# Patient Record
Sex: FEMALE | Race: OTHER | NOT HISPANIC OR LATINO
[De-identification: names, ages, dates, MRNs, and addresses within clinical notes are randomized per-mention and may not be internally consistent; named-entity substitution may affect disease eponyms.]

---

## 2017-02-01 ENCOUNTER — APPOINTMENT (OUTPATIENT)
Dept: OTOLARYNGOLOGY | Facility: CLINIC | Age: 72
End: 2017-02-01

## 2017-02-01 VITALS
OXYGEN SATURATION: 98 % | TEMPERATURE: 98.7 F | HEART RATE: 59 BPM | DIASTOLIC BLOOD PRESSURE: 68 MMHG | SYSTOLIC BLOOD PRESSURE: 125 MMHG

## 2017-02-01 RX ORDER — AMOXICILLIN AND CLAVULANATE POTASSIUM 875; 125 MG/1; MG/1
875-125 TABLET, COATED ORAL
Qty: 20 | Refills: 0 | Status: ACTIVE | COMMUNITY
Start: 2017-02-01 | End: 1900-01-01

## 2017-02-13 ENCOUNTER — APPOINTMENT (OUTPATIENT)
Dept: OTOLARYNGOLOGY | Facility: CLINIC | Age: 72
End: 2017-02-13

## 2017-02-13 VITALS — DIASTOLIC BLOOD PRESSURE: 69 MMHG | SYSTOLIC BLOOD PRESSURE: 107 MMHG | OXYGEN SATURATION: 96 % | HEART RATE: 66 BPM

## 2017-03-13 ENCOUNTER — APPOINTMENT (OUTPATIENT)
Dept: OTOLARYNGOLOGY | Facility: CLINIC | Age: 72
End: 2017-03-13

## 2017-03-13 VITALS
HEART RATE: 68 BPM | TEMPERATURE: 98.5 F | DIASTOLIC BLOOD PRESSURE: 66 MMHG | OXYGEN SATURATION: 95 % | SYSTOLIC BLOOD PRESSURE: 119 MMHG

## 2017-03-13 RX ORDER — CLOBETASOL PROPIONATE 0.5 MG/G
0.05 CREAM TOPICAL 3 TIMES DAILY
Qty: 1 | Refills: 0 | Status: ACTIVE | COMMUNITY
Start: 2017-03-13 | End: 1900-01-01

## 2017-05-10 ENCOUNTER — RX RENEWAL (OUTPATIENT)
Age: 72
End: 2017-05-10

## 2017-05-15 ENCOUNTER — APPOINTMENT (OUTPATIENT)
Dept: OTOLARYNGOLOGY | Facility: CLINIC | Age: 72
End: 2017-05-15

## 2017-05-15 VITALS
HEART RATE: 61 BPM | DIASTOLIC BLOOD PRESSURE: 73 MMHG | OXYGEN SATURATION: 98 % | TEMPERATURE: 98.3 F | SYSTOLIC BLOOD PRESSURE: 129 MMHG

## 2017-05-15 RX ORDER — LOSARTAN POTASSIUM 100 MG/1
100 TABLET, FILM COATED ORAL
Qty: 90 | Refills: 0 | Status: ACTIVE | COMMUNITY
Start: 2016-05-23

## 2017-05-15 RX ORDER — AMLODIPINE BESYLATE 10 MG/1
10 TABLET ORAL
Qty: 90 | Refills: 0 | Status: ACTIVE | COMMUNITY
Start: 2016-05-23

## 2017-05-15 RX ORDER — TIMOLOL MALEATE 5 MG/ML
0.5 SOLUTION OPHTHALMIC
Qty: 15 | Refills: 0 | Status: ACTIVE | COMMUNITY
Start: 2017-02-06

## 2017-05-15 RX ORDER — METFORMIN HYDROCHLORIDE 500 MG/1
500 TABLET, COATED ORAL
Qty: 180 | Refills: 0 | Status: ACTIVE | COMMUNITY
Start: 2016-05-23

## 2017-05-15 RX ORDER — SULFAMETHOXAZOLE AND TRIMETHOPRIM 800; 160 MG/1; MG/1
800-160 TABLET ORAL
Qty: 20 | Refills: 0 | Status: ACTIVE | COMMUNITY
Start: 2017-05-15 | End: 1900-01-01

## 2017-05-15 RX ORDER — ATORVASTATIN CALCIUM 10 MG/1
10 TABLET, FILM COATED ORAL
Qty: 90 | Refills: 0 | Status: ACTIVE | COMMUNITY
Start: 2017-01-18

## 2017-06-05 ENCOUNTER — APPOINTMENT (OUTPATIENT)
Dept: OTOLARYNGOLOGY | Facility: CLINIC | Age: 72
End: 2017-06-05

## 2017-06-05 VITALS
SYSTOLIC BLOOD PRESSURE: 119 MMHG | HEART RATE: 59 BPM | OXYGEN SATURATION: 96 % | TEMPERATURE: 98.2 F | DIASTOLIC BLOOD PRESSURE: 69 MMHG

## 2017-07-06 ENCOUNTER — APPOINTMENT (OUTPATIENT)
Dept: OTOLARYNGOLOGY | Facility: CLINIC | Age: 72
End: 2017-07-06

## 2017-07-06 VITALS
OXYGEN SATURATION: 98 % | DIASTOLIC BLOOD PRESSURE: 77 MMHG | HEART RATE: 59 BPM | SYSTOLIC BLOOD PRESSURE: 120 MMHG | TEMPERATURE: 97.8 F

## 2017-08-21 ENCOUNTER — APPOINTMENT (OUTPATIENT)
Dept: OTOLARYNGOLOGY | Facility: CLINIC | Age: 72
End: 2017-08-21
Payer: MEDICARE

## 2017-08-21 VITALS — HEART RATE: 67 BPM | SYSTOLIC BLOOD PRESSURE: 117 MMHG | DIASTOLIC BLOOD PRESSURE: 73 MMHG | OXYGEN SATURATION: 97 %

## 2017-08-21 PROCEDURE — 99213 OFFICE O/P EST LOW 20 MIN: CPT

## 2017-08-21 RX ORDER — CLOBETASOL PROPIONATE 0.5 MG/G
0.05 CREAM TOPICAL TWICE DAILY
Qty: 1 | Refills: 2 | Status: ACTIVE | COMMUNITY
Start: 2017-08-21 | End: 1900-01-01

## 2017-09-11 ENCOUNTER — APPOINTMENT (OUTPATIENT)
Dept: OTOLARYNGOLOGY | Facility: CLINIC | Age: 72
End: 2017-09-11
Payer: MEDICARE

## 2017-09-11 VITALS — SYSTOLIC BLOOD PRESSURE: 124 MMHG | OXYGEN SATURATION: 98 % | HEART RATE: 74 BPM | DIASTOLIC BLOOD PRESSURE: 72 MMHG

## 2017-09-11 DIAGNOSIS — H92.01 OTALGIA, RIGHT EAR: ICD-10-CM

## 2017-09-11 PROCEDURE — 99213 OFFICE O/P EST LOW 20 MIN: CPT

## 2017-11-28 ENCOUNTER — RX RENEWAL (OUTPATIENT)
Age: 72
End: 2017-11-28

## 2018-03-05 ENCOUNTER — APPOINTMENT (OUTPATIENT)
Dept: OTOLARYNGOLOGY | Facility: CLINIC | Age: 73
End: 2018-03-05
Payer: MEDICARE

## 2018-03-05 VITALS
SYSTOLIC BLOOD PRESSURE: 120 MMHG | DIASTOLIC BLOOD PRESSURE: 68 MMHG | HEART RATE: 69 BPM | OXYGEN SATURATION: 97 % | TEMPERATURE: 98.7 F

## 2018-03-05 PROCEDURE — 99213 OFFICE O/P EST LOW 20 MIN: CPT

## 2018-03-05 RX ORDER — CLOBETASOL PROPIONATE 0.5 MG/G
0.05 CREAM TOPICAL TWICE DAILY
Qty: 1 | Refills: 2 | Status: ACTIVE | COMMUNITY
Start: 2018-03-05 | End: 1900-01-01

## 2018-09-10 ENCOUNTER — APPOINTMENT (OUTPATIENT)
Dept: OTOLARYNGOLOGY | Facility: CLINIC | Age: 73
End: 2018-09-10
Payer: MEDICARE

## 2018-09-10 VITALS
SYSTOLIC BLOOD PRESSURE: 142 MMHG | DIASTOLIC BLOOD PRESSURE: 64 MMHG | TEMPERATURE: 97.9 F | OXYGEN SATURATION: 97 % | HEART RATE: 61 BPM | RESPIRATION RATE: 14 BRPM

## 2018-09-10 DIAGNOSIS — H90.41 SENSORINEURAL HEARING LOSS, UNILATERAL, RIGHT EAR, WITH UNRESTRICTED HEARING ON THE CONTRALATERAL SIDE: ICD-10-CM

## 2018-09-10 PROCEDURE — 99214 OFFICE O/P EST MOD 30 MIN: CPT

## 2018-09-10 RX ORDER — CLOBETASOL PROPIONATE 0.5 MG/G
0.05 CREAM TOPICAL TWICE DAILY
Qty: 1 | Refills: 2 | Status: ACTIVE | COMMUNITY
Start: 2018-09-10 | End: 1900-01-01

## 2018-09-10 RX ORDER — MECLIZINE HYDROCHLORIDE 25 MG/1
25 TABLET ORAL 3 TIMES DAILY
Qty: 60 | Refills: 0 | Status: ACTIVE | COMMUNITY
Start: 2018-09-10 | End: 1900-01-01

## 2018-10-01 ENCOUNTER — RX RENEWAL (OUTPATIENT)
Age: 73
End: 2018-10-01

## 2018-10-01 RX ORDER — FLUTICASONE PROPIONATE 50 UG/1
50 SPRAY, METERED NASAL DAILY
Qty: 16 | Refills: 2 | Status: ACTIVE | COMMUNITY
Start: 2018-03-05 | End: 1900-01-01

## 2019-03-11 ENCOUNTER — APPOINTMENT (OUTPATIENT)
Dept: PHARMACY | Facility: CLINIC | Age: 74
End: 2019-03-11
Payer: SELF-PAY

## 2019-03-11 ENCOUNTER — APPOINTMENT (OUTPATIENT)
Dept: OTOLARYNGOLOGY | Facility: CLINIC | Age: 74
End: 2019-03-11
Payer: SELF-PAY

## 2019-03-11 VITALS
TEMPERATURE: 98.5 F | HEART RATE: 66 BPM | SYSTOLIC BLOOD PRESSURE: 124 MMHG | OXYGEN SATURATION: 98 % | DIASTOLIC BLOOD PRESSURE: 75 MMHG

## 2019-03-11 DIAGNOSIS — H60.8X1 OTHER OTITIS EXTERNA, RIGHT EAR: ICD-10-CM

## 2019-03-11 PROCEDURE — 99212 OFFICE O/P EST SF 10 MIN: CPT

## 2019-03-11 PROCEDURE — V5266B: CUSTOM

## 2019-03-11 RX ORDER — CLOBETASOL PROPIONATE 0.5 MG/G
0.05 CREAM TOPICAL TWICE DAILY
Qty: 1 | Refills: 2 | Status: ACTIVE | COMMUNITY
Start: 2019-03-11 | End: 1900-01-01

## 2019-03-11 NOTE — HISTORY OF PRESENT ILLNESS
[de-identified] : 74 yo woman with r md and profound snhl had baha and is habving difficulty getting batteries and has itchy skin with some eruption around baha post. She has had no meniere's attacks. Had AR in the apst but nse is clear. ear is very itchy and is out of clobetasol. -f.s.c no fh relevant to cc

## 2019-03-11 NOTE — PHYSICAL EXAM
[Normal] : assessment of respiratory effort is normal [FreeTextEntry2] : mild inflammation around baha post does not look infected; looks irrittated

## 2019-03-11 NOTE — REASON FOR VISIT
[Subsequent Evaluation] : a subsequent evaluation for [FreeTextEntry2] : noe zamora and itchy skin around baha

## 2019-09-09 ENCOUNTER — APPOINTMENT (OUTPATIENT)
Dept: OTOLARYNGOLOGY | Facility: CLINIC | Age: 74
End: 2019-09-09
Payer: SELF-PAY

## 2019-09-09 VITALS
OXYGEN SATURATION: 99 % | SYSTOLIC BLOOD PRESSURE: 117 MMHG | HEART RATE: 67 BPM | TEMPERATURE: 98.1 F | DIASTOLIC BLOOD PRESSURE: 71 MMHG

## 2019-09-09 DIAGNOSIS — H90.41 SENSORINEURAL HEARING LOSS, UNILATERAL, RIGHT EAR, WITH UNRESTRICTED HEARING ON THE CONTRALATERAL SIDE: ICD-10-CM

## 2019-09-09 PROCEDURE — 99213 OFFICE O/P EST LOW 20 MIN: CPT

## 2019-09-09 NOTE — PHYSICAL EXAM
[Normal] : no masses and lesions seen, face is symmetric [FreeTextEntry2] : [facility] site clean and dry

## 2019-09-09 NOTE — HISTORY OF PRESENT ILLNESS
[de-identified] : 75 yo woman with r meneire's disease and asymm profound snhl on r has had baha and is here to check. She has had no meneire's sx in 6 mo, since last visit. -hearing flucuations or vertigo. gets occ irritation around r baha post for which she uses occ clobetasol. nonsmoker.

## 2019-09-20 ENCOUNTER — RESULT REVIEW (OUTPATIENT)
Age: 74
End: 2019-09-20

## 2019-09-20 ENCOUNTER — OUTPATIENT (OUTPATIENT)
Dept: OUTPATIENT SERVICES | Facility: HOSPITAL | Age: 74
LOS: 1 days | Discharge: HOME | End: 2019-09-20
Payer: MEDICARE

## 2019-09-20 LAB — GLUCOSE BLDC GLUCOMTR-MCNC: 100 MG/DL — HIGH (ref 70–99)

## 2019-09-20 PROCEDURE — 82962 GLUCOSE BLOOD TEST: CPT

## 2019-09-20 PROCEDURE — 88305 TISSUE EXAM BY PATHOLOGIST: CPT

## 2019-09-20 PROCEDURE — 45380 COLONOSCOPY AND BIOPSY: CPT | Mod: PT

## 2019-09-23 LAB — SURGICAL PATHOLOGY STUDY: SIGNIFICANT CHANGE UP

## 2020-03-09 ENCOUNTER — APPOINTMENT (OUTPATIENT)
Dept: OTOLARYNGOLOGY | Facility: CLINIC | Age: 75
End: 2020-03-09
Payer: MEDICARE

## 2020-03-09 VITALS
TEMPERATURE: 98.8 F | SYSTOLIC BLOOD PRESSURE: 144 MMHG | HEART RATE: 68 BPM | OXYGEN SATURATION: 99 % | DIASTOLIC BLOOD PRESSURE: 67 MMHG

## 2020-03-09 PROCEDURE — 99213 OFFICE O/P EST LOW 20 MIN: CPT

## 2020-03-09 RX ORDER — MUPIROCIN 20 MG/G
2 OINTMENT TOPICAL 3 TIMES DAILY
Qty: 1 | Refills: 1 | Status: ACTIVE | COMMUNITY
Start: 2020-03-09 | End: 1900-01-01

## 2020-03-09 NOTE — HISTORY OF PRESENT ILLNESS
[de-identified] : 73 yo woman with r baha she has used until a month ago when she began to have difficulty cleaning it and she and her sister have noticed yellow drainage and scant blood around the abutment when she cleans it with qtip or steroid cream. -f/s/c  she has long h/o r md and has had no attacks since sept. has no hearing AD.

## 2020-03-09 NOTE — PHYSICAL EXAM
[Normal] : no masses and lesions seen, face is symmetric [FreeTextEntry2] : baha post with scant dry yellow drainage around it cleaned and ointmentr applied

## 2020-04-06 ENCOUNTER — APPOINTMENT (OUTPATIENT)
Dept: OTOLARYNGOLOGY | Facility: CLINIC | Age: 75
End: 2020-04-06

## 2021-04-21 ENCOUNTER — APPOINTMENT (OUTPATIENT)
Dept: OTOLARYNGOLOGY | Facility: CLINIC | Age: 76
End: 2021-04-21
Payer: MEDICARE

## 2021-04-21 VITALS
WEIGHT: 111 LBS | OXYGEN SATURATION: 97 % | SYSTOLIC BLOOD PRESSURE: 123 MMHG | BODY MASS INDEX: 23.3 KG/M2 | TEMPERATURE: 97.3 F | HEIGHT: 58 IN | DIASTOLIC BLOOD PRESSURE: 59 MMHG | HEART RATE: 68 BPM

## 2021-04-21 DIAGNOSIS — H81.01 MENIERE'S DISEASE, RIGHT EAR: ICD-10-CM

## 2021-04-21 DIAGNOSIS — R04.0 EPISTAXIS: ICD-10-CM

## 2021-04-21 PROCEDURE — 31238 NSL/SINS NDSC SRG NSL HEMRRG: CPT

## 2021-04-21 PROCEDURE — 99214 OFFICE O/P EST MOD 30 MIN: CPT | Mod: 25

## 2021-04-21 PROCEDURE — 99072 ADDL SUPL MATRL&STAF TM PHE: CPT

## 2021-04-21 NOTE — HISTORY OF PRESENT ILLNESS
[de-identified] : 76 yo woman had epistaxis 1 episode last week and one last night after forcefully blowing her nose. She has not had this in the past. It stopped in a few minutes but she put head backwards. She has h/o Meniere's but has not had an attack since seen last 1 yr ago and has r severe  snhl for which she has BAHA.She uses the BAHA sporadically.

## 2021-04-21 NOTE — PROCEDURE
[Osteomeatal Pathology] : osteomeatal pathology [Anterior rhinoscopy insufficient to account for symptoms] : anterior rhinoscopy insufficient to account for symptoms [Flexible Endoscope] : examined with the flexible endoscope [Cauterized w/Silver Nitrate] : the bleeding was cauterized with silver nitrate [Normal] : the paranasal sinuses had no abnormalities [Serial Number: ___] : Serial Number: [unfilled] [FreeTextEntry6] : l septal ulcer and vessel visible - no other potential bleeding sites on either side\par done for epistaxis [FreeTextEntry2] : vessel seen on l septum with ulcer

## 2021-04-21 NOTE — PHYSICAL EXAM
[Normal] : no rashes [de-identified] : ulcer and visible vessel l septum [FreeTextEntry2] : [facility] site clean and dry [de-identified] : gait steady

## 2021-10-20 ENCOUNTER — APPOINTMENT (OUTPATIENT)
Dept: OTOLARYNGOLOGY | Facility: CLINIC | Age: 76
End: 2021-10-20
Payer: MEDICARE

## 2021-10-20 VITALS
SYSTOLIC BLOOD PRESSURE: 121 MMHG | HEART RATE: 80 BPM | TEMPERATURE: 97.2 F | DIASTOLIC BLOOD PRESSURE: 72 MMHG | OXYGEN SATURATION: 98 %

## 2021-10-20 DIAGNOSIS — L25.9 UNSPECIFIED CONTACT DERMATITIS, UNSPECIFIED CAUSE: ICD-10-CM

## 2021-10-20 PROCEDURE — 92550 TYMPANOMETRY & REFLEX THRESH: CPT

## 2021-10-20 PROCEDURE — 92557 COMPREHENSIVE HEARING TEST: CPT

## 2021-10-20 PROCEDURE — 99213 OFFICE O/P EST LOW 20 MIN: CPT

## 2021-10-20 NOTE — REASON FOR VISIT
[Subsequent Evaluation] : a subsequent evaluation for [FreeTextEntry2] : Meniere's disease, hearing loss

## 2021-10-20 NOTE — ASSESSMENT
[FreeTextEntry1] : r snhl - she prefers to contiue baha for now - may consider removal later\par l snhl mildly decreased over 8 yrs discussed amplification - she prefers to hold off\par clobetasol as needed for baha site - it is ok today\par continue lo sodium diet for meniere's - she no longer takes diuretics\par rtc 6 mo and as needed

## 2021-10-20 NOTE — HISTORY OF PRESENT ILLNESS
[de-identified] : 6 mo followup 75 yo F with h/o r Meniere's disease - she has had 2 relatively mild attacks in the past 6 mo. She still gets occ dermatitis of r baha and uses clobetasol whenit occurs. She wishes to consider removal of r baha.

## 2021-10-20 NOTE — DATA REVIEWED
[de-identified] :  r severe snhl with poor discrim; l hf snhl mild for speech - reviewed with pt and compared to 2013 - r similar l slightly worse

## 2021-10-20 NOTE — PHYSICAL EXAM
[Normal] : assessment of respiratory effort is normal [FreeTextEntry2] : bha abutment site clean and dry, not irritated [de-identified] : gait steady

## 2021-11-18 ENCOUNTER — APPOINTMENT (OUTPATIENT)
Dept: OTOLARYNGOLOGY | Facility: CLINIC | Age: 76
End: 2021-11-18
Payer: MEDICARE

## 2021-11-18 VITALS
TEMPERATURE: 98.2 F | OXYGEN SATURATION: 100 % | SYSTOLIC BLOOD PRESSURE: 124 MMHG | BODY MASS INDEX: 23.3 KG/M2 | HEART RATE: 65 BPM | WEIGHT: 111 LBS | HEIGHT: 58 IN | DIASTOLIC BLOOD PRESSURE: 83 MMHG

## 2021-11-18 DIAGNOSIS — L03.811 CELLULITIS OF HEAD [ANY PART, EXCEPT FACE]: ICD-10-CM

## 2021-11-18 PROCEDURE — 99213 OFFICE O/P EST LOW 20 MIN: CPT

## 2021-11-18 RX ORDER — AMOXICILLIN AND CLAVULANATE POTASSIUM 875; 125 MG/1; MG/1
875-125 TABLET, COATED ORAL
Qty: 20 | Refills: 0 | Status: ACTIVE | COMMUNITY
Start: 2021-11-18 | End: 1900-01-01

## 2021-11-18 RX ORDER — MUPIROCIN 20 MG/G
2 OINTMENT TOPICAL 3 TIMES DAILY
Qty: 1 | Refills: 2 | Status: ACTIVE | COMMUNITY
Start: 2021-11-18 | End: 1900-01-01

## 2021-11-18 NOTE — HISTORY OF PRESENT ILLNESS
[de-identified] : 1 month followup appt for this 75 yo woman with end stage r meniere's disease and has r baha for snhl. She no longer uses it and is considering removing it.She c/o 2 weeks of increasing discolored drainage around the wound she cleans daily with some blood and tenderness, mild. -f.sc

## 2021-11-18 NOTE — ASSESSMENT
[FreeTextEntry1] : Meniere's stable on lo sodium diet and diuretic - possible early infection of skin around baha abutment\par augmentin, bactroban\par rtc 1 week\par discussed removal of abutment - she does not want to schedule as of yet.

## 2021-11-18 NOTE — REASON FOR VISIT
[Subsequent Evaluation] : a subsequent evaluation for [FreeTextEntry2] : infection around [facility]

## 2021-12-13 ENCOUNTER — APPOINTMENT (OUTPATIENT)
Dept: OTOLARYNGOLOGY | Facility: CLINIC | Age: 76
End: 2021-12-13
Payer: MEDICARE

## 2021-12-13 VITALS
SYSTOLIC BLOOD PRESSURE: 122 MMHG | OXYGEN SATURATION: 98 % | BODY MASS INDEX: 23.3 KG/M2 | WEIGHT: 111 LBS | TEMPERATURE: 97.9 F | HEART RATE: 68 BPM | HEIGHT: 58 IN | DIASTOLIC BLOOD PRESSURE: 67 MMHG

## 2021-12-13 DIAGNOSIS — L03.811 CELLULITIS OF HEAD [ANY PART, EXCEPT FACE]: ICD-10-CM

## 2021-12-13 PROCEDURE — 99213 OFFICE O/P EST LOW 20 MIN: CPT

## 2021-12-13 NOTE — ASSESSMENT
[FreeTextEntry1] : recurrent baha abutment infxs\par she no longer uses the baha\par discussed risks benefits and alts to removal\par she wished to proceed\par scheduled at her convenience\par ct ordered first\par rtc with ct

## 2021-12-13 NOTE — REASON FOR VISIT
[Subsequent Evaluation] : a subsequent evaluation for [FreeTextEntry2] : ab liu infected BAHA abutment

## 2021-12-13 NOTE — HISTORY OF PRESENT ILLNESS
[de-identified] : 1 mo followup appt for this 75 yo F seen with c/o redness and swelling of area around r baha abutment for which she was treated with augmentin and bactroban. She reports it improved and then 2 weeks ago became red and inflamed again. She applied warm compresses to it and cleaned it and he inflammation resolved. she brought in a picture of it. She no longer uses the baha.

## 2021-12-13 NOTE — DATA REVIEWED
[de-identified] : form 10.21 - r asymm nicole [de-identified] : pts picture showed a lot of surrounding inflammation

## 2021-12-13 NOTE — PHYSICAL EXAM
[Normal] : tympanic membranes are normal in both ears [FreeTextEntry2] : baha abutment and currounding area clean and dry, not inflmaed

## 2022-01-04 ENCOUNTER — NON-APPOINTMENT (OUTPATIENT)
Age: 77
End: 2022-01-04

## 2022-01-12 ENCOUNTER — NON-APPOINTMENT (OUTPATIENT)
Age: 77
End: 2022-01-12

## 2022-01-13 ENCOUNTER — NON-APPOINTMENT (OUTPATIENT)
Age: 77
End: 2022-01-13

## 2022-01-14 ENCOUNTER — NON-APPOINTMENT (OUTPATIENT)
Age: 77
End: 2022-01-14

## 2022-01-14 ENCOUNTER — APPOINTMENT (OUTPATIENT)
Dept: OTOLARYNGOLOGY | Facility: CLINIC | Age: 77
End: 2022-01-14
Payer: MEDICARE

## 2022-01-14 VITALS
SYSTOLIC BLOOD PRESSURE: 143 MMHG | OXYGEN SATURATION: 98 % | HEART RATE: 70 BPM | TEMPERATURE: 98.6 F | DIASTOLIC BLOOD PRESSURE: 78 MMHG

## 2022-01-14 PROCEDURE — 99213 OFFICE O/P EST LOW 20 MIN: CPT

## 2022-01-14 RX ORDER — TIMOLOL MALEATE 5 MG/ML
0.5 SOLUTION OPHTHALMIC
Qty: 15 | Refills: 0 | Status: ACTIVE | COMMUNITY
Start: 2021-06-08

## 2022-01-14 RX ORDER — LISINOPRIL 10 MG/1
10 TABLET ORAL
Qty: 90 | Refills: 0 | Status: ACTIVE | COMMUNITY
Start: 2021-03-05

## 2022-01-14 NOTE — PHYSICAL EXAM
[Normal] : assessment of respiratory effort is normal [FreeTextEntry2] : abutment clean and dry [de-identified] : gait steady

## 2022-01-14 NOTE — REASON FOR VISIT
[Subsequent Evaluation] : a subsequent evaluation for [FreeTextEntry2] : followup baha abutment she would like removeed

## 2022-01-14 NOTE — HISTORY OF PRESENT ILLNESS
[de-identified] : followup 77 yo F with Baha abutment on r she would like removed and wound closed. She is scheduled ro surgery next week. had ct and is here to review. Denies pain or drainage.

## 2022-01-14 NOTE — ASSESSMENT
[FreeTextEntry1] : she is stable and abutment is clean and dry; surgery to proceed as scheduled ct ok has had no nsaids needs preop covid test. risks benefits and alts to abutment removal with cover screw placement, wound revision and closure discussed, questions answered and she wishes to proceed

## 2022-01-14 NOTE — DATA REVIEWED
[de-identified] : ct screw in proper pos'n bone deep intact. tha rivero, s/p r mastoid surgery (els) reviewed images and report with pt

## 2022-01-15 LAB — SARS-COV-2 N GENE NPH QL NAA+PROBE: NOT DETECTED

## 2022-01-16 RX ORDER — CEFUROXIME AXETIL 500 MG/1
500 TABLET ORAL
Qty: 14 | Refills: 0 | Status: ACTIVE | COMMUNITY
Start: 2022-01-16 | End: 1900-01-01

## 2022-01-16 RX ORDER — MUPIROCIN 20 MG/G
2 OINTMENT TOPICAL 3 TIMES DAILY
Qty: 1 | Refills: 2 | Status: ACTIVE | COMMUNITY
Start: 2022-01-16 | End: 1900-01-01

## 2022-01-16 RX ORDER — HYDROCODONE BITARTRATE AND ACETAMINOPHEN 5; 325 MG/1; MG/1
5-325 TABLET ORAL
Qty: 10 | Refills: 0 | Status: ACTIVE | COMMUNITY
Start: 2022-01-16 | End: 1900-01-01

## 2022-01-17 ENCOUNTER — RESULT REVIEW (OUTPATIENT)
Age: 77
End: 2022-01-17

## 2022-01-18 ENCOUNTER — APPOINTMENT (OUTPATIENT)
Dept: OTOLARYNGOLOGY | Facility: AMBULATORY SURGERY CENTER | Age: 77
End: 2022-01-18

## 2022-01-18 ENCOUNTER — OUTPATIENT (OUTPATIENT)
Dept: OUTPATIENT SERVICES | Facility: HOSPITAL | Age: 77
LOS: 1 days | Discharge: HOME | End: 2022-01-18
Payer: MEDICARE

## 2022-01-18 LAB
GLUCOSE BLDC GLUCOMTR-MCNC: 103 MG/DL — HIGH (ref 70–99)
GLUCOSE BLDC GLUCOMTR-MCNC: 80 MG/DL — SIGNIFICANT CHANGE UP (ref 70–99)

## 2022-01-18 PROCEDURE — 88300 SURGICAL PATH GROSS: CPT | Mod: 26

## 2022-01-18 PROCEDURE — 69717 RPLCMT OI IMPLT SKL PRQ ESP: CPT | Mod: LT

## 2022-01-18 DEVICE — IMPLANTABLE DEVICE: Type: IMPLANTABLE DEVICE | Status: FUNCTIONAL

## 2022-01-20 LAB — SURGICAL PATHOLOGY STUDY: SIGNIFICANT CHANGE UP

## 2022-01-24 ENCOUNTER — APPOINTMENT (OUTPATIENT)
Dept: OTOLARYNGOLOGY | Facility: CLINIC | Age: 77
End: 2022-01-24
Payer: COMMERCIAL

## 2022-01-24 VITALS
TEMPERATURE: 98.2 F | HEART RATE: 79 BPM | DIASTOLIC BLOOD PRESSURE: 74 MMHG | OXYGEN SATURATION: 97 % | SYSTOLIC BLOOD PRESSURE: 117 MMHG

## 2022-01-24 PROCEDURE — 99024 POSTOP FOLLOW-UP VISIT: CPT

## 2022-01-24 NOTE — HISTORY OF PRESENT ILLNESS
[de-identified] : 77 y/o F s/p R BAHA abutment removal 6 days ago here for postop check. She is doing well, denies pain. No new ENT complaints at this time.

## 2022-01-24 NOTE — ASSESSMENT
[FreeTextEntry1] : s/p R BAHA abutment removal and wound revision 6 days ago\par -R postauricular wound clean, dry, intact\par -sutures removed\par -cleaned with hydrogen peroxide\par -bactroban TID\par -healing well \par RTC in 1 week

## 2022-01-24 NOTE — PHYSICAL EXAM
[Normal] : no rashes [de-identified] : R postauricular wound clean, dry, intact, no discoloration, healing well - suture removed with #11 blade [de-identified] : gait steady

## 2022-01-24 NOTE — REASON FOR VISIT
[Subsequent Evaluation] : a subsequent evaluation for [FreeTextEntry2] : postop BAHA abutment removal, wound revision

## 2022-02-01 ENCOUNTER — APPOINTMENT (OUTPATIENT)
Dept: OTOLARYNGOLOGY | Facility: CLINIC | Age: 77
End: 2022-02-01
Payer: COMMERCIAL

## 2022-02-01 VITALS
BODY MASS INDEX: 23.3 KG/M2 | HEART RATE: 81 BPM | TEMPERATURE: 98.2 F | WEIGHT: 111 LBS | SYSTOLIC BLOOD PRESSURE: 122 MMHG | DIASTOLIC BLOOD PRESSURE: 73 MMHG | OXYGEN SATURATION: 99 % | HEIGHT: 58 IN

## 2022-02-01 DIAGNOSIS — Z09 ENCOUNTER FOR FOLLOW-UP EXAMINATION AFTER COMPLETED TREATMENT FOR CONDITIONS OTHER THAN MALIGNANT NEOPLASM: ICD-10-CM

## 2022-02-01 PROCEDURE — 99024 POSTOP FOLLOW-UP VISIT: CPT

## 2022-02-01 RX ORDER — MECLIZINE HYDROCHLORIDE 25 MG/1
25 TABLET ORAL 3 TIMES DAILY
Qty: 30 | Refills: 0 | Status: ACTIVE | COMMUNITY
Start: 2022-02-01 | End: 1900-01-01

## 2022-02-01 NOTE — PHYSICAL EXAM
[Normal] : no masses and lesions seen, face is symmetric [FreeTextEntry2] : wound well-cristobal [de-identified] : gait steady

## 2022-02-01 NOTE — HISTORY OF PRESENT ILLNESS
[de-identified] : 77 yo F 2 weeks postop r baha abutment removal, wound revision and closure. She has been using ointment on the wound. She ahs no complaints and states the area feels much better.

## 2022-02-01 NOTE — ASSESSMENT
[FreeTextEntry1] : wound well-healed\par doing very well\par meclizine renewed per her request\par rtc 6 mo

## 2022-04-26 ENCOUNTER — APPOINTMENT (OUTPATIENT)
Dept: OTOLARYNGOLOGY | Facility: CLINIC | Age: 77
End: 2022-04-26
Payer: MEDICARE

## 2022-04-26 VITALS
HEIGHT: 59 IN | BODY MASS INDEX: 22.58 KG/M2 | DIASTOLIC BLOOD PRESSURE: 77 MMHG | OXYGEN SATURATION: 98 % | TEMPERATURE: 97.2 F | SYSTOLIC BLOOD PRESSURE: 126 MMHG | HEART RATE: 78 BPM | WEIGHT: 112 LBS

## 2022-04-26 DIAGNOSIS — H61.21 IMPACTED CERUMEN, RIGHT EAR: ICD-10-CM

## 2022-04-26 PROCEDURE — 99213 OFFICE O/P EST LOW 20 MIN: CPT | Mod: 25

## 2022-04-26 PROCEDURE — 92550 TYMPANOMETRY & REFLEX THRESH: CPT

## 2022-04-26 PROCEDURE — 92557 COMPREHENSIVE HEARING TEST: CPT

## 2022-04-26 PROCEDURE — G0268 REMOVAL OF IMPACTED WAX MD: CPT

## 2022-04-26 RX ORDER — MECLIZINE HYDROCHLORIDE 25 MG/1
25 TABLET ORAL 3 TIMES DAILY
Qty: 30 | Refills: 0 | Status: ACTIVE | COMMUNITY
Start: 2022-04-26 | End: 1900-01-01

## 2022-04-26 NOTE — HISTORY OF PRESENT ILLNESS
[de-identified] : 3 mo followup appt for this 75 yo F with h/o r Meniere's disease and r snhl - at her request r baha abutment was removed with wound closure in 1/2022 - she feels that he Meniere's sx have recurred in recent weeks with increased r snhl and intermittent tinnitus. No inciting event. Her r hl was profound prior to abutment removal. Denies vertigo. She notices fluctuations in hearing. She is not following low sodium diet.

## 2022-04-26 NOTE — REASON FOR VISIT
[Subsequent Evaluation] : a subsequent evaluation for [FreeTextEntry2] : r tinnitus and hl, H/o Meniere's

## 2022-04-26 NOTE — ASSESSMENT
[FreeTextEntry1] : r perceived worsened hearing and intermittent tinnitus; cerumen removed symptoms did not change \par - stable from 10/21, r severe snhl, L hf snhl- reviewed with pt and reassured\par -lo sodium diet- 1500 mg - urged to follow\par -rec she stays wll hydrated\par -meclizine renewed per her request\par RTC in 3 weeks or sooner as needed; if does not improve can consider diuretic btu she is already on multiple antihypertensives

## 2022-05-16 ENCOUNTER — APPOINTMENT (OUTPATIENT)
Dept: OTOLARYNGOLOGY | Facility: CLINIC | Age: 77
End: 2022-05-16
Payer: MEDICARE

## 2022-05-16 VITALS
WEIGHT: 113 LBS | TEMPERATURE: 97.7 F | OXYGEN SATURATION: 99 % | HEART RATE: 62 BPM | DIASTOLIC BLOOD PRESSURE: 66 MMHG | SYSTOLIC BLOOD PRESSURE: 103 MMHG | BODY MASS INDEX: 22.78 KG/M2 | HEIGHT: 59 IN

## 2022-05-16 PROCEDURE — 99213 OFFICE O/P EST LOW 20 MIN: CPT

## 2022-05-16 NOTE — REASON FOR VISIT
[Subsequent Evaluation] : a subsequent evaluation for [FreeTextEntry2] : r tinnitus and Meniere's disease

## 2022-05-16 NOTE — ASSESSMENT
[FreeTextEntry1] : Meniere's improved on low sodium diet and adequate hydration\par doing well; advised to continue as is\par asked to follow up in 6 mo or sooner for any problems
English

## 2022-05-16 NOTE — HISTORY OF PRESENT ILLNESS
[de-identified] : 3 week followup appt for this 77 yo F with history of r Meniere's Disease. She was reporting increasing perceived r hl and tinnitus but was not following 1500 mg sodium diet carefully. She has now followed this carefully for 3 weeks and feels the tinnitus is improved, back to baseline.

## 2022-08-02 ENCOUNTER — APPOINTMENT (OUTPATIENT)
Dept: OTOLARYNGOLOGY | Facility: CLINIC | Age: 77
End: 2022-08-02

## 2022-08-02 VITALS
WEIGHT: 113 LBS | RESPIRATION RATE: 16 BRPM | TEMPERATURE: 97.2 F | SYSTOLIC BLOOD PRESSURE: 117 MMHG | DIASTOLIC BLOOD PRESSURE: 66 MMHG | OXYGEN SATURATION: 97 % | BODY MASS INDEX: 22.78 KG/M2 | HEIGHT: 59 IN | HEART RATE: 68 BPM

## 2022-08-02 DIAGNOSIS — H93.11 TINNITUS, RIGHT EAR: ICD-10-CM

## 2022-08-02 PROCEDURE — 99213 OFFICE O/P EST LOW 20 MIN: CPT

## 2022-08-02 NOTE — PHYSICAL EXAM
[Normal] : assessment of respiratory effort is normal [FreeTextEntry2] : baha removal site clean and dry [de-identified] : gait steady

## 2022-08-02 NOTE — HISTORY OF PRESENT ILLNESS
[de-identified] : 2.5 mo followup appt for this 77 yo f with R Meniere's Disease. She states that she has had no fluctuations in hearing, no vertigo or fullness since last visit but has had r tinnitus for yrs, blowing in quality, continuous, noticed when it is quiet, like before going to sleep. She states that it is mildly bothersome.

## 2022-08-02 NOTE — ASSESSMENT
[FreeTextEntry1] : R Meniere's Disease - stable, continue as is\par R tinnitus - explained it is due to hl and recs can include inc bckd noise, trt -, masking, she prefers inc bckgd noise.\par has had poor hearin for extended period of time- on r -discussed ci but she prefers to hold off., not ideal candidate.  \par \par rtc 6 mo and as needed

## 2022-10-21 NOTE — PHYSICAL EXAM
[Normal] : assessment of respiratory effort is normal [de-identified] : r copoius cerumen impaction removed atraumatically with suction, tm nl; l eac and tm nl [FreeTextEntry2] : scalp wound well healed [de-identified] : gait steady 4

## 2022-11-21 ENCOUNTER — APPOINTMENT (OUTPATIENT)
Dept: OTOLARYNGOLOGY | Facility: CLINIC | Age: 77
End: 2022-11-21

## 2023-02-06 ENCOUNTER — APPOINTMENT (OUTPATIENT)
Dept: OTOLARYNGOLOGY | Facility: CLINIC | Age: 78
End: 2023-02-06

## 2023-03-23 ENCOUNTER — APPOINTMENT (OUTPATIENT)
Dept: OTOLARYNGOLOGY | Facility: CLINIC | Age: 78
End: 2023-03-23

## 2023-08-02 ENCOUNTER — NON-APPOINTMENT (OUTPATIENT)
Age: 78
End: 2023-08-02

## 2023-08-04 ENCOUNTER — APPOINTMENT (OUTPATIENT)
Dept: OTOLARYNGOLOGY | Facility: CLINIC | Age: 78
End: 2023-08-04
Payer: MEDICARE

## 2023-08-04 VITALS
OXYGEN SATURATION: 100 % | TEMPERATURE: 97.3 F | HEIGHT: 59 IN | SYSTOLIC BLOOD PRESSURE: 153 MMHG | HEART RATE: 70 BPM | WEIGHT: 110 LBS | BODY MASS INDEX: 22.18 KG/M2 | DIASTOLIC BLOOD PRESSURE: 72 MMHG

## 2023-08-04 PROCEDURE — 99214 OFFICE O/P EST MOD 30 MIN: CPT | Mod: 25

## 2023-08-04 PROCEDURE — 69210 REMOVE IMPACTED EAR WAX UNI: CPT

## 2023-08-04 NOTE — ASSESSMENT
[FreeTextEntry1] : vertigo unclear if this is due to Meniere's but has not had an attack since before els yrs ago continue lo sodium diet hydration and diuretic mri mra brain and neck vng,  rtc with above meclizine prn

## 2023-08-04 NOTE — PROCEDURE
[Cerumen Impaction] : Cerumen Impaction [Same] : same as the Pre Op Dx. [] : Removal of Cerumen [FreeTextEntry6] : r copious cerumen removed atraumatically with suction- tm nl

## 2023-08-04 NOTE — HISTORY OF PRESENT ILLNESS
[de-identified] : 15 mo follow up appt for this 76 yo f with H/o MD. She reports she had 2 episodes of vertigo lasting 30 min 1 week ago and 3 d ago. Took meclizine and felt better. Had epistaxis before the first episode. No hearing changes. No head pressure. We had advised her to go to e.r. but she did not. She has had no other attacks of vertigo since her els surgery yrs ago. She is taking diuretic On low sodium diet.

## 2023-08-04 NOTE — PHYSICAL EXAM
[de-identified] : r copiouscerumen removed atraumatically with suction [Normal] : no nystagmus [FreeTextEntry2] : VII intact [de-identified] : gait steady

## 2023-08-08 ENCOUNTER — APPOINTMENT (OUTPATIENT)
Dept: OTOLARYNGOLOGY | Facility: CLINIC | Age: 78
End: 2023-08-08
Payer: MEDICARE

## 2023-08-08 VITALS
WEIGHT: 110 LBS | HEIGHT: 59 IN | HEART RATE: 90 BPM | BODY MASS INDEX: 22.18 KG/M2 | TEMPERATURE: 98 F | DIASTOLIC BLOOD PRESSURE: 72 MMHG | SYSTOLIC BLOOD PRESSURE: 127 MMHG | OXYGEN SATURATION: 96 %

## 2023-08-08 DIAGNOSIS — H81.09 MENIERE'S DISEASE, UNSPECIFIED EAR: ICD-10-CM

## 2023-08-08 DIAGNOSIS — J30.1 ALLERGIC RHINITIS DUE TO POLLEN: ICD-10-CM

## 2023-08-08 PROCEDURE — 99214 OFFICE O/P EST MOD 30 MIN: CPT

## 2023-08-08 NOTE — HISTORY OF PRESENT ILLNESS
[de-identified] : 4 day followup visit for this 76 y/o F with h/o Meniere's Disease. She had recent episodes of vertigo and is taking meclizine PRN. She is following lo sodium diet and is on a diuretic. She is here to review MRI. She is scheduled for VNG, , MRA brain and neck next week. She had r els surgery several years ago. She reports her nose is occasionally congested in the morning. She stated vertigo is much better.

## 2023-08-08 NOTE — PHYSICAL EXAM
[Normal] : mucosa is normal [Midline] : trachea located in midline position [de-identified] : ar [de-identified] : ar [de-identified] : gait steady

## 2023-08-14 ENCOUNTER — APPOINTMENT (OUTPATIENT)
Dept: OTOLARYNGOLOGY | Facility: CLINIC | Age: 78
End: 2023-08-14
Payer: MEDICARE

## 2023-08-14 VITALS
SYSTOLIC BLOOD PRESSURE: 114 MMHG | HEIGHT: 59 IN | TEMPERATURE: 98.2 F | HEART RATE: 63 BPM | OXYGEN SATURATION: 97 % | WEIGHT: 110 LBS | BODY MASS INDEX: 22.18 KG/M2 | DIASTOLIC BLOOD PRESSURE: 70 MMHG

## 2023-08-14 DIAGNOSIS — R42 DIZZINESS AND GIDDINESS: ICD-10-CM

## 2023-08-14 DIAGNOSIS — H90.A21 SENSORINEURAL HEARING LOSS, UNILATERAL, RIGHT EAR, WITH RESTRICTED HEARING ON THE CONTRALATERAL SIDE: ICD-10-CM

## 2023-08-14 PROCEDURE — 92557 COMPREHENSIVE HEARING TEST: CPT

## 2023-08-14 PROCEDURE — 99213 OFFICE O/P EST LOW 20 MIN: CPT

## 2023-08-14 PROCEDURE — 92550 TYMPANOMETRY & REFLEX THRESH: CPT | Mod: 52

## 2023-08-14 NOTE — ASSESSMENT
[FreeTextEntry1] : vertigo, h/o Meniere's Disease  -continue lo sodium diet- 1500 mg, hydration, diuretic -MRI revealed partial r mastoidectomy surgical changes, chronic white matter changes, stable sinus congestion -pt took meclizine this morning- recommended she postpone VNG -for stiff neck recommended motrin, offered to have her see sports medicine provider- she preferred to hold off - revealed r snhl, l hf snhl- overall stable when compared with 22 -results reviewed with pt  RTC with VNG to review findings

## 2023-08-14 NOTE — HISTORY OF PRESENT ILLNESS
[de-identified] : 6 day followup visit for this 79 y/o F with h/o Meniere's Disease. She is following lo sodium diet and is on a diuretic. She had r els surgery several years ago. MRI revealed partial r mastoidectomy surgical changes, chronic white matter changes, stable sinus congestion. She is here to have VNG and . She took meclizine this morning because she had a stiff neck (which has since resolved) so we recommended postponing VNG.

## 2023-08-14 NOTE — DATA REVIEWED
[de-identified] :  revealed r snhl, l hf snhl- overall stable when compared with 22 -results reviewed with pt

## 2023-09-26 ENCOUNTER — APPOINTMENT (OUTPATIENT)
Dept: OTOLARYNGOLOGY | Facility: CLINIC | Age: 78
End: 2023-09-26
Payer: MEDICARE

## 2023-09-26 VITALS
OXYGEN SATURATION: 98 % | SYSTOLIC BLOOD PRESSURE: 128 MMHG | HEIGHT: 59 IN | DIASTOLIC BLOOD PRESSURE: 66 MMHG | TEMPERATURE: 98 F | BODY MASS INDEX: 22.18 KG/M2 | HEART RATE: 66 BPM | WEIGHT: 110 LBS

## 2023-09-26 DIAGNOSIS — R42 DIZZINESS AND GIDDINESS: ICD-10-CM

## 2023-09-26 PROCEDURE — 99213 OFFICE O/P EST LOW 20 MIN: CPT

## 2023-09-26 PROCEDURE — 92540 BASIC VESTIBULAR EVALUATION: CPT

## 2023-09-26 PROCEDURE — 92537 CALORIC VSTBLR TEST W/REC: CPT

## 2024-03-18 ENCOUNTER — APPOINTMENT (OUTPATIENT)
Dept: OTOLARYNGOLOGY | Facility: CLINIC | Age: 79
End: 2024-03-18
Payer: MEDICARE

## 2024-03-18 DIAGNOSIS — Z91.09 OTHER ALLERGY STATUS, OTHER THAN TO DRUGS AND BIOLOGICAL SUBSTANCES: ICD-10-CM

## 2024-03-18 DIAGNOSIS — H81.01 MENIERE'S DISEASE, RIGHT EAR: ICD-10-CM

## 2024-03-18 DIAGNOSIS — H61.21 IMPACTED CERUMEN, RIGHT EAR: ICD-10-CM

## 2024-03-18 DIAGNOSIS — H90.41 SENSORINEURAL HEARING LOSS, UNILATERAL, RIGHT EAR, WITH UNRESTRICTED HEARING ON THE CONTRALATERAL SIDE: ICD-10-CM

## 2024-03-18 PROCEDURE — 92550 TYMPANOMETRY & REFLEX THRESH: CPT | Mod: 52

## 2024-03-18 PROCEDURE — G0268 REMOVAL OF IMPACTED WAX MD: CPT

## 2024-03-18 PROCEDURE — 99213 OFFICE O/P EST LOW 20 MIN: CPT | Mod: 25

## 2024-03-18 PROCEDURE — 92557 COMPREHENSIVE HEARING TEST: CPT

## 2024-03-18 RX ORDER — FLUTICASONE PROPIONATE 50 UG/1
50 SPRAY, METERED NASAL
Qty: 48 | Refills: 1 | Status: ACTIVE | COMMUNITY
Start: 2024-03-18 | End: 1900-01-01

## 2024-03-18 NOTE — ASSESSMENT
[FreeTextEntry1] : cerumen removed er felt better discussed amplification for r asymm snhl BICROS vs L HA vs r ci- she prefers leave as is continue low sodium diet, hydration flonase renewed  rtc 6 mo

## 2024-03-18 NOTE — REASON FOR VISIT
[Subsequent Evaluation] : a subsequent evaluation for [FreeTextEntry2] : Followup r Meniere's Disease

## 2024-03-18 NOTE — PHYSICAL EXAM
[Normal] : no neck adenopathy [de-identified] : r copious cerumen removed atraumatically with curet [de-identified] : ar [de-identified] : ar [de-identified] : gait steady

## 2024-03-18 NOTE — PROCEDURE
[Cerumen Impaction] : Cerumen Impaction [Same] : same as the Pre Op Dx. [] : Removal of Cerumen [FreeTextEntry6] : r copious cerumen removed atraumatically with curet

## 2024-03-18 NOTE — HISTORY OF PRESENT ILLNESS
[de-identified] : 6 mo follow up appt for this 79 yo f with h/o MARTIN CABALLERO - she has had no further vertigo after last visit - missed a step and broke ankle not dizzy. On low sodium diet. Also has inhalant allergy and is beginning to get some congestion and asked for flonase.

## 2024-06-03 NOTE — ASSESSMENT
Caller: Neil Sanders    Relationship to patient: Self    Best call back number:   [phone]    Type of visit: PHYSICAL     Requested date: AS SOON AS POSSIBLE     If rescheduling, when is the original appointment: 6/13/24    Additional notes: PATIENT WOULD LIKE TO BE WORKED IN SOONER THAN MARCH OF 2025.    [FreeTextEntry1] : 1. vertigo, h/o Meniere's Disease -MRI revealed partial r mastoidectomy surgical changes, chronic white matter changes, stable sinus congestion -images and report reviewed with pt  -VNG - 2. AR -avoid allergens -saline bid for nasal drip. (borderline glaucoma) RTC with MRA brain/ neck, VNG,  to review findings

## 2024-09-18 ENCOUNTER — RX RENEWAL (OUTPATIENT)
Age: 79
End: 2024-09-18

## 2024-09-23 ENCOUNTER — NON-APPOINTMENT (OUTPATIENT)
Age: 79
End: 2024-09-23

## 2024-09-24 ENCOUNTER — APPOINTMENT (OUTPATIENT)
Dept: OTOLARYNGOLOGY | Facility: CLINIC | Age: 79
End: 2024-09-24
Payer: MEDICARE

## 2024-09-24 VITALS
DIASTOLIC BLOOD PRESSURE: 64 MMHG | HEIGHT: 59 IN | TEMPERATURE: 98 F | HEART RATE: 72 BPM | OXYGEN SATURATION: 98 % | SYSTOLIC BLOOD PRESSURE: 112 MMHG | WEIGHT: 110 LBS | BODY MASS INDEX: 22.18 KG/M2

## 2024-09-24 DIAGNOSIS — H81.01 MENIERE'S DISEASE, RIGHT EAR: ICD-10-CM

## 2024-09-24 DIAGNOSIS — H61.22 IMPACTED CERUMEN, LEFT EAR: ICD-10-CM

## 2024-09-24 DIAGNOSIS — H90.41 SENSORINEURAL HEARING LOSS, UNILATERAL, RIGHT EAR, WITH UNRESTRICTED HEARING ON THE CONTRALATERAL SIDE: ICD-10-CM

## 2024-09-24 PROCEDURE — 99213 OFFICE O/P EST LOW 20 MIN: CPT

## 2024-09-24 RX ORDER — MECLIZINE HYDROCHLORIDE 25 MG/1
25 TABLET ORAL 3 TIMES DAILY
Qty: 30 | Refills: 0 | Status: ACTIVE | COMMUNITY
Start: 2024-09-24 | End: 1900-01-01

## 2024-09-24 NOTE — ASSESSMENT
[FreeTextEntry1] : 1. r Meniere's Disease -continue lo sodium diet- 1500 mg, hydration -clinically stable 2. l cerumen impaction  -cerumen removed -ear felt better -avoid qtip usage RTC in 6 months; call sooner for any issues

## 2024-09-24 NOTE — PHYSICAL EXAM
[Normal] : no nystagmus [de-identified] : right abutment removal site- clean/ dry [de-identified] : r nl, l scant cerumen removed atraumatically with curette, b TMs nl  [de-identified] : gait steady

## 2024-09-24 NOTE — PROCEDURE
[Cerumen Impaction] : Cerumen Impaction [Same] : same as the Pre Op Dx. [] : Removal of Cerumen [FreeTextEntry5] : r nl, l scant cerumen removed atraumatically with curette, b TMs nl

## 2024-09-24 NOTE — HISTORY OF PRESENT ILLNESS
[de-identified] : 6 month followup visit for this 78 y/o F with history of right Meniere's Disease. She is following a low sodium diet, and has had no attacks of vertigo. She feels her hearing has been stable.

## 2025-03-15 ENCOUNTER — NON-APPOINTMENT (OUTPATIENT)
Age: 80
End: 2025-03-15

## 2025-03-24 ENCOUNTER — NON-APPOINTMENT (OUTPATIENT)
Age: 80
End: 2025-03-24

## 2025-03-24 ENCOUNTER — APPOINTMENT (OUTPATIENT)
Dept: OTOLARYNGOLOGY | Facility: CLINIC | Age: 80
End: 2025-03-24
Payer: MEDICARE

## 2025-03-24 ENCOUNTER — TRANSCRIPTION ENCOUNTER (OUTPATIENT)
Age: 80
End: 2025-03-24

## 2025-03-24 VITALS
OXYGEN SATURATION: 98 % | HEART RATE: 66 BPM | WEIGHT: 114 LBS | TEMPERATURE: 98 F | SYSTOLIC BLOOD PRESSURE: 116 MMHG | HEIGHT: 59 IN | DIASTOLIC BLOOD PRESSURE: 67 MMHG | BODY MASS INDEX: 22.98 KG/M2

## 2025-03-24 DIAGNOSIS — H81.01 MENIERE'S DISEASE, RIGHT EAR: ICD-10-CM

## 2025-03-24 DIAGNOSIS — J30.1 ALLERGIC RHINITIS DUE TO POLLEN: ICD-10-CM

## 2025-03-24 DIAGNOSIS — H90.A21 SENSORINEURAL HEARING LOSS, UNILATERAL, RIGHT EAR, WITH RESTRICTED HEARING ON THE CONTRALATERAL SIDE: ICD-10-CM

## 2025-03-24 PROCEDURE — 99213 OFFICE O/P EST LOW 20 MIN: CPT

## 2025-04-24 NOTE — DATA REVIEWED
Currently there are no signs of infection to your wound.  Your wound will scab over and heal appropriately via secondary intention.  Unfortunately are the window to have the wound closed with sutures.    If you develop any new or worsening redness, swelling, pain, recommend you take antibiotics as prescribed.  Follow-up with your primary care physician in 2 to 3 days to ensure that wound is healing appropriately.  See referral information if you do not have a PCP.      Additional referral information of hand specialist/plastic surgeon provided if you would like reevaluation of your wound.    For your pain, you may take over-the-counter Tylenol and/or ibuprofen.    Return to the ER immediately if you develop any discharge/ drainage, redness/ swelling, pain, fevers, numbness, tingling, or any other new or concerning symptoms.     [de-identified] : MRI revealed partial r mastoidectomy surgical changes, chronic white matter changes, stable sinus congestion -images and report reviewed with pt

## 2025-09-08 ENCOUNTER — APPOINTMENT (OUTPATIENT)
Dept: OTOLARYNGOLOGY | Facility: CLINIC | Age: 80
End: 2025-09-08
Payer: MEDICARE

## 2025-09-08 VITALS
HEIGHT: 59 IN | HEART RATE: 63 BPM | WEIGHT: 114 LBS | OXYGEN SATURATION: 98 % | SYSTOLIC BLOOD PRESSURE: 122 MMHG | BODY MASS INDEX: 22.98 KG/M2 | DIASTOLIC BLOOD PRESSURE: 68 MMHG | TEMPERATURE: 97.7 F

## 2025-09-08 DIAGNOSIS — H81.01 MENIERE'S DISEASE, RIGHT EAR: ICD-10-CM

## 2025-09-08 DIAGNOSIS — H90.A21 SENSORINEURAL HEARING LOSS, UNILATERAL, RIGHT EAR, WITH RESTRICTED HEARING ON THE CONTRALATERAL SIDE: ICD-10-CM

## 2025-09-08 DIAGNOSIS — H81.09 MENIERE'S DISEASE, UNSPECIFIED EAR: ICD-10-CM

## 2025-09-08 DIAGNOSIS — H61.21 IMPACTED CERUMEN, RIGHT EAR: ICD-10-CM

## 2025-09-08 DIAGNOSIS — R42 DIZZINESS AND GIDDINESS: ICD-10-CM

## 2025-09-08 PROCEDURE — 92557 COMPREHENSIVE HEARING TEST: CPT

## 2025-09-08 PROCEDURE — 92567 TYMPANOMETRY: CPT

## 2025-09-08 PROCEDURE — 99213 OFFICE O/P EST LOW 20 MIN: CPT

## (undated) DEVICE — CATH IV SAFE INSYTE 16G X 1.16" (GRAY)

## (undated) DEVICE — GLV 7.5 PROTEXIS (WHITE)

## (undated) DEVICE — PACK TYMPANOMASTOIDECTOMY LF

## (undated) DEVICE — DRSG GLASSOCK ADULT

## (undated) DEVICE — WARMING BLANKET LOWER ADULT

## (undated) DEVICE — SUT ETHILON 4-0 18" FS-2

## (undated) DEVICE — DRAPE NEUROLOGICAL

## (undated) DEVICE — SLV COMPRESSION KNEE MED

## (undated) DEVICE — DRSG KERLIX ROLL 4.5"